# Patient Record
Sex: FEMALE | Race: WHITE | NOT HISPANIC OR LATINO | Employment: FULL TIME | URBAN - METROPOLITAN AREA
[De-identification: names, ages, dates, MRNs, and addresses within clinical notes are randomized per-mention and may not be internally consistent; named-entity substitution may affect disease eponyms.]

---

## 2017-04-10 PROBLEM — M51.360 LUMBAR DISCOGENIC PAIN SYNDROME: Status: ACTIVE | Noted: 2017-04-10

## 2024-07-26 ENCOUNTER — OFFICE VISIT (OUTPATIENT)
Dept: FAMILY MEDICINE CLINIC | Facility: CLINIC | Age: 54
End: 2024-07-26
Payer: COMMERCIAL

## 2024-07-26 VITALS
OXYGEN SATURATION: 98 % | HEIGHT: 66 IN | TEMPERATURE: 97.9 F | WEIGHT: 193.4 LBS | HEART RATE: 74 BPM | DIASTOLIC BLOOD PRESSURE: 80 MMHG | BODY MASS INDEX: 31.08 KG/M2 | SYSTOLIC BLOOD PRESSURE: 128 MMHG

## 2024-07-26 DIAGNOSIS — G89.29 CHRONIC BILATERAL LOW BACK PAIN WITH LEFT-SIDED SCIATICA: ICD-10-CM

## 2024-07-26 DIAGNOSIS — M54.2 NECK PAIN OF OVER 3 MONTHS DURATION: ICD-10-CM

## 2024-07-26 DIAGNOSIS — Z00.00 GENERAL MEDICAL EXAM: Primary | ICD-10-CM

## 2024-07-26 DIAGNOSIS — F41.8 SITUATIONAL ANXIETY: ICD-10-CM

## 2024-07-26 DIAGNOSIS — R20.0 BILATERAL ARM NUMBNESS AND TINGLING WHILE SLEEPING: ICD-10-CM

## 2024-07-26 DIAGNOSIS — M54.42 CHRONIC BILATERAL LOW BACK PAIN WITH LEFT-SIDED SCIATICA: ICD-10-CM

## 2024-07-26 DIAGNOSIS — M54.12 CERVICAL RADICULOPATHY: ICD-10-CM

## 2024-07-26 DIAGNOSIS — F32.A ANXIETY AND DEPRESSION: ICD-10-CM

## 2024-07-26 DIAGNOSIS — I42.9 CARDIOMYOPATHY, UNSPECIFIED TYPE: ICD-10-CM

## 2024-07-26 DIAGNOSIS — F41.9 ANXIETY AND DEPRESSION: ICD-10-CM

## 2024-07-26 DIAGNOSIS — R20.2 BILATERAL ARM NUMBNESS AND TINGLING WHILE SLEEPING: ICD-10-CM

## 2024-07-26 DIAGNOSIS — M79.602 PAIN IN BOTH UPPER EXTREMITIES: ICD-10-CM

## 2024-07-26 DIAGNOSIS — M79.601 PAIN IN BOTH UPPER EXTREMITIES: ICD-10-CM

## 2024-07-26 PROCEDURE — 99396 PREV VISIT EST AGE 40-64: CPT | Performed by: PHYSICIAN ASSISTANT

## 2024-07-26 RX ORDER — ALPRAZOLAM 0.5 MG/1
0.5 TABLET ORAL 3 TIMES DAILY PRN
Qty: 90 TABLET | Refills: 5 | Status: SHIPPED | OUTPATIENT
Start: 2024-07-26

## 2024-07-26 RX ORDER — GABAPENTIN 600 MG/1
600 TABLET ORAL 4 TIMES DAILY
Qty: 120 TABLET | Refills: 5 | Status: SHIPPED | OUTPATIENT
Start: 2024-07-26

## 2024-07-26 NOTE — PROGRESS NOTES
Subjective   Violet Ledesma is a 54 y.o. female  Annual Exam (Annual Physical, cardiologist is blade rogel), Anxiety (Refill on alprazolam ), Shoulder Pain (Increased bilateral shoulder pain, worse in the past month ), and Back Pain (Refill on Gabapentin)      Anxiety    Back Pain  Pertinent negatives include no numbness or weakness.     History of Present Illness  Patient presents today for a preventive medical visit.  Patient is here to determine screening labs and tests that are due and to determine immunization status as well.  Patient will be counseled regarding preventative medicine issues such as regular exercise and healthy diet as well.      The patient is a 54-year-old female who is coming in today for an annual physical. She is also following up on chronic medical problems and having chronic medications refilled.    The patient reports a history of shoulder issues, which began a few years ago. She underwent a right rotator cuff repair in 2013, which resulted in a minor tear. Despite undergoing physical therapy, her condition improved. She suspects a nerve issue in her neck, as she experiences numbness in both arms, particularly at night, which disrupts her sleep with her arms above her head. She also reports numbness in her fingertips, accompanied by aching. She also reports pain in her arms, shoulders, and elbows, which radiates from her shoulders to her elbows. She suspects a pinched nerve. Over the past 1.5 months, she has had to cease repetitive activities such as mowing her zero-turn  due to pain in her shoulders and arms. Despite the pain, she manages to finish her mower. She was scheduled for an MRI or CT scan, but her symptoms began post-procedure. Her elbows and wrists are unaffected. She has been on gabapentin for several years, which effectively manages her symptoms in her legs.    The patient underwent a pacemaker check-up last week at [facility], during which a new phenomenon  was identified. She was advised to lose weight, which she is currently walking 100 miles per month. Her cardiologist did not conduct any blood work. She consumes protein drinks and has not had her cholesterol checked.    Supplemental Information  She gets her estrogen patch filled from Gynecology. She has not had any issues since she started taking Dexilant. She wears contacts and gets her eyes checked every year. Her bowels are fine.    The following portions of the patient's history were reviewed and updated as appropriate: allergies, current medications, past social history and problem list    Review of Systems   Constitutional: Negative.  Positive for activity change.   HENT: Negative.     Eyes: Negative.    Respiratory: Negative.     Cardiovascular: Negative.    Gastrointestinal: Negative.    Endocrine: Negative.    Genitourinary: Negative.    Musculoskeletal: Negative.  Positive for arthralgias, back pain and myalgias. Negative for gait problem and joint swelling.   Skin: Negative.    Allergic/Immunologic: Negative.    Neurological: Negative.  Negative for weakness and numbness.   Hematological: Negative.    Psychiatric/Behavioral: Negative.     All other systems reviewed and are negative.      Objective     Vitals:    07/26/24 1417   BP: 128/80   Pulse: 74   Temp: 97.9 °F (36.6 °C)   SpO2: 98%       Physical Exam  Physical Exam      Assessment & Plan   Assessment & Plan  Discussed preventative medicine issues with patient including regular exercise, healthy diet, stress reduction, adequate sleep and recommended age-appropriate screening studies.      1. Annual physical.  The patient's BMP, BNP, complete blood count, and thyroid levels were all within normal ranges. A prescription for Zepbound, a weight loss injection, has been issued. Additionally, a refill of Xanax, gabapentin, Entresto, and Dexilant has been provided. A cholesterol test has been ordered.    2. Bilateral shoulder pain.  An MRI of the neck  has been ordered. The patient has been advised to continue her gabapentin regimen.    Diagnoses and all orders for this visit:    1. General medical exam (Primary)    2. Chronic bilateral low back pain with left-sided sciatica  -     gabapentin (NEURONTIN) 600 MG tablet; Take 1 tablet by mouth 4 (Four) Times a Day. For neuropathy  Dispense: 120 tablet; Refill: 5       As part of this patient's treatment plan, patient will be prescribed controlled substances. The patient has been made aware of appropriate use of such medications, including potential risk of somnolence, limited ability to drive and /or work safely, and potential for dependence or overdose. It has also been made clear that these medications are for use by this patient only, without concomitant use of alcohol or other substances unless prescribed.Controlled substance status of medication discussed with patient, discussed risks of medication including abuse potential and diversion potential and need to follow up for reevaluation appointment in order to receive further refills.    Part of this note may be an electronic transcription/translation of spoken language to printed text using the Dragon Dictation System.      Patient or patient representative verbalized consent for the use of Ambient Listening during the visit with  Kianna Gandhi PA-C for chart documentation. 7/30/2024  12:48 EDT

## 2024-07-29 ENCOUNTER — TELEPHONE (OUTPATIENT)
Dept: FAMILY MEDICINE CLINIC | Facility: CLINIC | Age: 54
End: 2024-07-29
Payer: COMMERCIAL

## 2024-07-29 NOTE — TELEPHONE ENCOUNTER
"      Hub staff attempted to follow warm transfer process and was unsuccessful     Caller: Violet Ledesma \"Therese\"    Relationship to patient: Self    Best call back number: [phone]    PATIENT WAS NOTIFIED BY PHARMACY THAT  A PRIOR AUTHORIZATION IS NEEDED FOR      Tirzepatide-Weight Management (ZEPBOUND) 2.5 MG/0.5ML solution auto-injector             "

## 2024-07-30 ENCOUNTER — TELEPHONE (OUTPATIENT)
Dept: FAMILY MEDICINE CLINIC | Facility: CLINIC | Age: 54
End: 2024-07-30
Payer: COMMERCIAL

## 2024-07-30 NOTE — TELEPHONE ENCOUNTER
Violet,       The Zepbound was denied under your insurance, they did not provide any additional information on why they have denied the medication. They will be sending you a full detailed letter in the mail with an explanation. I have attached their response below:     VIOLET NAPOLES  Drug  Zepbound 2.5MG/0.5ML pen-injectors  [facility]-Medicare Electronic PA Form   Denied   Denied request. A notification with additional details has been faxed to your office.

## 2024-08-30 ENCOUNTER — TELEPHONE (OUTPATIENT)
Dept: FAMILY MEDICINE CLINIC | Facility: CLINIC | Age: 54
End: 2024-08-30
Payer: COMMERCIAL

## 2024-08-30 DIAGNOSIS — M54.9 CHRONIC BACK PAIN, UNSPECIFIED BACK LOCATION, UNSPECIFIED BACK PAIN LATERALITY: ICD-10-CM

## 2024-08-30 DIAGNOSIS — G89.29 CHRONIC BACK PAIN, UNSPECIFIED BACK LOCATION, UNSPECIFIED BACK PAIN LATERALITY: ICD-10-CM

## 2024-08-30 DIAGNOSIS — M25.512 CHRONIC PAIN OF BOTH SHOULDERS: Primary | ICD-10-CM

## 2024-08-30 DIAGNOSIS — M25.511 CHRONIC PAIN OF BOTH SHOULDERS: Primary | ICD-10-CM

## 2024-08-30 DIAGNOSIS — M54.2 NECK PAIN: ICD-10-CM

## 2024-08-30 DIAGNOSIS — G89.29 CHRONIC PAIN OF BOTH SHOULDERS: Primary | ICD-10-CM

## 2024-08-30 NOTE — TELEPHONE ENCOUNTER
[employer] is denying authorization for MRI c spine. They want her to do physical therapy. I cannot tell that she has done this. I have sent patient a [employer] message to see if she has. In the mean time, can we get a referral for her to start physical therapy?

## 2024-08-30 NOTE — TELEPHONE ENCOUNTER
Physical therapy order has been placed I placed it as an outside order since she lives in [place] please check with her and see where she wants to attend PT.

## 2024-09-23 ENCOUNTER — OFFICE VISIT (OUTPATIENT)
Dept: FAMILY MEDICINE CLINIC | Facility: CLINIC | Age: 54
End: 2024-09-23
Payer: COMMERCIAL

## 2024-09-23 VITALS
BODY MASS INDEX: 29.6 KG/M2 | SYSTOLIC BLOOD PRESSURE: 126 MMHG | TEMPERATURE: 97.8 F | HEIGHT: 66 IN | WEIGHT: 184.2 LBS | OXYGEN SATURATION: 98 % | HEART RATE: 72 BPM | DIASTOLIC BLOOD PRESSURE: 80 MMHG

## 2024-09-23 DIAGNOSIS — I42.9 CARDIOMYOPATHY, UNSPECIFIED TYPE: ICD-10-CM

## 2024-09-23 DIAGNOSIS — E66.3 OVERWEIGHT (BMI 25.0-29.9): Primary | ICD-10-CM

## 2024-09-23 DIAGNOSIS — M51.26 LUMBAR DISCOGENIC PAIN SYNDROME: ICD-10-CM

## 2024-09-23 PROBLEM — R53.81 PHYSICAL DECONDITIONING: Status: RESOLVED | Noted: 2017-01-31 | Resolved: 2024-09-23

## 2024-09-23 PROCEDURE — 99213 OFFICE O/P EST LOW 20 MIN: CPT | Performed by: PHYSICIAN ASSISTANT

## 2024-10-14 NOTE — TELEPHONE ENCOUNTER
"Caller: Violet Ledesma \"Therese\"    Relationship: Self    Best call back number: [phone]     Requested Prescriptions:   Requested Prescriptions     Pending Prescriptions Disp Refills    Tirzepatide-Weight Management (ZEPBOUND) 5 MG/0.5ML solution auto-injector 2 mL 3     Sig: Inject 0.5 mL under the skin into the appropriate area as directed 1 (One) Time Per Week.        Pharmacy where request should be sent: [address] [phone] [facility] [phone]      Last office visit with prescribing clinician: 9/23/2024   Last telemedicine visit with prescribing clinician: Visit date not found   Next office visit with prescribing clinician: Visit date not found     Additional details provided by patient: PATIENT IS REQUESTING THE NEXT DOSE UP FOR THIS MEDICATION.     IF PA IS NEEDED FOR THE NEW DOSE PLEASE SUBMIT .    Does the patient have less than a 3 day supply:  [] Yes  [] No    Would you like a call back once the refill request has been completed: [x] Yes [] No    If the office needs to give you a call back, can they leave a voicemail: [] Yes [] No    Ezekiel Lopez Rep   10/14/24 10:56 EDT         "

## 2024-11-20 ENCOUNTER — TELEPHONE (OUTPATIENT)
Dept: FAMILY MEDICINE CLINIC | Facility: CLINIC | Age: 54
End: 2024-11-20
Payer: COMMERCIAL

## 2024-11-20 NOTE — TELEPHONE ENCOUNTER
"  Caller: Violet Ledesma \"Therese\"    Relationship: Self    Best call back number: [phone]     What is the best time to reach you: ANY    Who are you requesting to speak with (clinical staff, provider,  specific staff member): NURSE    Do you know the name of the person who called: PATIENT    What was the call regarding: PATIENT WOULD LIKE INCREASE IN DOSAGE OF ZEPBOUND.      [facility]    Is it okay if the provider responds through MyChart: PHONE CALL PLEASE  "

## 2025-01-10 NOTE — TELEPHONE ENCOUNTER
"Caller: Violet Ledesma \"Therese\"    Relationship: Self    Best call back number:  [phone] (Mobile)     Requested Prescriptions:   Requested Prescriptions      No prescriptions requested or ordered in this encounter      [facility]     Pharmacy where request should be sent:  [facility]     Last office visit with prescribing clinician: 9/23/2024   Last telemedicine visit with prescribing clinician: Visit date not found   Next office visit with prescribing clinician: Visit date not found     Additional details provided by patient:  PATIENT IS REQUESTING TO INCREASE THE DOSAGE     Does the patient have less than a 3 day supply:  [x] Yes  [] No    Would you like a call back once the refill request has been completed: [] Yes [x] No    If the office needs to give you a call back, can they leave a voicemail: [] Yes [x] No    "

## 2025-02-03 RX ORDER — FAMOTIDINE 40 MG/1
TABLET, FILM COATED ORAL
Qty: 90 TABLET | Refills: 3 | Status: SHIPPED | OUTPATIENT
Start: 2025-02-03

## 2025-02-13 ENCOUNTER — TELEPHONE (OUTPATIENT)
Dept: FAMILY MEDICINE CLINIC | Facility: CLINIC | Age: 55
End: 2025-02-13
Payer: COMMERCIAL

## 2025-02-13 RX ORDER — ONDANSETRON 4 MG/1
4 TABLET, ORALLY DISINTEGRATING ORAL EVERY 8 HOURS PRN
Qty: 15 TABLET | Refills: 1 | Status: SHIPPED | OUTPATIENT
Start: 2025-02-13

## 2025-02-13 NOTE — TELEPHONE ENCOUNTER
"    Caller: Violet Ledesma \"Therese\"    Relationship: Self    Best call back number: [phone]     What medication are you requesting: ZOFRAN 8MG    What are your current symptoms: STOMACH VIRUS    How long have you been experiencing symptoms: 3 DAYS        If a prescription is needed, what is your preferred pharmacy and phone number: [facility] - [address] [phone] [facility] [phone]      Additional notes:PATIENT STATED SHE CAN'T KEEP ANYTHING DOWN AND REQUEST TO SEE IF PCP CAN CALL THE ABOVE IN PLEASE. CALL PATIENT TO ADVISE          "

## 2025-02-28 DIAGNOSIS — M54.42 CHRONIC BILATERAL LOW BACK PAIN WITH LEFT-SIDED SCIATICA: ICD-10-CM

## 2025-02-28 DIAGNOSIS — G89.29 CHRONIC BILATERAL LOW BACK PAIN WITH LEFT-SIDED SCIATICA: ICD-10-CM

## 2025-02-28 RX ORDER — GABAPENTIN 600 MG/1
TABLET ORAL
Qty: 120 TABLET | Refills: 0 | OUTPATIENT
Start: 2025-02-28

## 2025-02-28 RX ORDER — DEXLANSOPRAZOLE 60 MG/1
CAPSULE, DELAYED RELEASE ORAL
Qty: 90 CAPSULE | Refills: 3 | Status: SHIPPED | OUTPATIENT
Start: 2025-02-28

## 2025-03-05 ENCOUNTER — OFFICE VISIT (OUTPATIENT)
Dept: FAMILY MEDICINE CLINIC | Facility: CLINIC | Age: 55
End: 2025-03-05
Payer: COMMERCIAL

## 2025-03-05 VITALS
BODY MASS INDEX: 23.3 KG/M2 | OXYGEN SATURATION: 99 % | TEMPERATURE: 98.4 F | DIASTOLIC BLOOD PRESSURE: 62 MMHG | HEART RATE: 91 BPM | SYSTOLIC BLOOD PRESSURE: 114 MMHG | WEIGHT: 145 LBS | HEIGHT: 66 IN

## 2025-03-05 DIAGNOSIS — E66.3 OVERWEIGHT (BMI 25.0-29.9): ICD-10-CM

## 2025-03-05 DIAGNOSIS — F41.8 SITUATIONAL ANXIETY: ICD-10-CM

## 2025-03-05 DIAGNOSIS — F32.A ANXIETY AND DEPRESSION: ICD-10-CM

## 2025-03-05 DIAGNOSIS — G89.29 CHRONIC BILATERAL LOW BACK PAIN WITH LEFT-SIDED SCIATICA: Primary | ICD-10-CM

## 2025-03-05 DIAGNOSIS — M54.42 CHRONIC BILATERAL LOW BACK PAIN WITH LEFT-SIDED SCIATICA: Primary | ICD-10-CM

## 2025-03-05 DIAGNOSIS — F41.9 ANXIETY AND DEPRESSION: ICD-10-CM

## 2025-03-05 DIAGNOSIS — Z51.81 ENCOUNTER FOR THERAPEUTIC DRUG MONITORING: Primary | ICD-10-CM

## 2025-03-05 DIAGNOSIS — I42.9 CARDIOMYOPATHY, UNSPECIFIED TYPE: ICD-10-CM

## 2025-03-05 DIAGNOSIS — Z12.11 COLON CANCER SCREENING: Primary | ICD-10-CM

## 2025-03-05 RX ORDER — TIZANIDINE 2 MG/1
2 TABLET ORAL NIGHTLY PRN
Qty: 90 TABLET | Refills: 3 | Status: SHIPPED | OUTPATIENT
Start: 2025-03-05

## 2025-03-05 RX ORDER — GABAPENTIN 600 MG/1
600 TABLET ORAL 4 TIMES DAILY
Qty: 120 TABLET | Refills: 5 | Status: SHIPPED | OUTPATIENT
Start: 2025-03-05

## 2025-03-05 RX ORDER — ALPRAZOLAM 0.5 MG
0.5 TABLET ORAL 3 TIMES DAILY PRN
Qty: 90 TABLET | Refills: 5 | Status: SHIPPED | OUTPATIENT
Start: 2025-03-05

## 2025-03-05 NOTE — PROGRESS NOTES
Subjective   Violet Ledesma is a 54 y.o. female  Anxiety (Refill on alprazolam), Back Pain (Refill on gabapentin), and Obesity (Follow up on weight with zepbound , doing well with the 12.5mg dosing)      History of Present Illness  History of Present Illness  The patient is a 54-year-old female who presents today for follow-up on weight loss management, generalized anxiety disorder, chronic back pain with radiculopathy, and medical management of these conditions.    She reports an overall improvement in her health status, attributing this to a significant weight loss of 40 pounds. She has been monitoring her blood pressure at home every couple of days and maintains adequate hydration. Her appetite remains satisfactory, and she continues to consume protein shakes. She reports regular bowel movements. She has not undergone any recent cholesterol testing but does not believe she has any issues related to it. She is scheduled for an echocardiogram at the end of April 2025.    She has been experiencing intermittent ear discomfort, described as a sensation of water in the ear accompanied by hearing loss and echoing sounds. However, she reports no current symptoms. She has been using Flonase, which appears to provide some relief.    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 She has been working 29 days straight without a break, which has been physically exhausting. She has requested a letter from her doctor recommending a work limit of 40 hours per week. She reports no respiratory distress, chest pain, or dizziness while at work. She feels that her anxiety has improved since starting work. She is currently taking Xanax as needed and feels that it is helping her.    She is currently taking gabapentin 4 times a day, which is helping her. She is also taking tizanidine as needed and requests a refill.    Supplemental Information  She had a consultation with her cardiologist  approximately a month ago, during which her medication regimen was adjusted due to low blood pressure, attributed to her recent weight loss. She is currently on Entresto and spironolactone, managed by her cardiologist. She is also on an estrogen patch, prescribed by her gynecologist. She recently had a mammogram and is unsure about the timing of her last colonoscopy.    SOCIAL HISTORY  She is working night shifts at [employer], a pharmaceutical company.    FAMILY HISTORY  She does not have a family history of colon cancer.    MEDICATIONS  Current: Entresto, tizanidine, Zepbound, spironolactone, montelukast, gabapentin, Xanax, estrogen patch, Flonase    The following portions of the patient's history were reviewed and updated as appropriate: allergies, current medications, past social history and problem list    Review of Systems   Constitutional:  Positive for activity change. Negative for fatigue and unexpected weight change.   HENT:  Positive for ear pain.    Respiratory: Negative.     Cardiovascular: Negative.    Musculoskeletal:  Positive for back pain (stable on medication).   Neurological:  Negative for tremors, weakness, light-headedness and headaches.   Psychiatric/Behavioral:  Negative for agitation, behavioral problems, confusion, decreased concentration, dysphoric mood, hallucinations and sleep disturbance. The patient is not nervous/anxious and is not hyperactive.        Objective     Vitals:    03/05/25 0901   BP: 114/62   Pulse: 91   Temp: 98.4 °F (36.9 °C)   SpO2: 99%       Physical Exam  Vitals and nursing note reviewed.   Constitutional:       General: She is not in acute distress.     Appearance: Normal appearance. She is well-developed. She is not ill-appearing, toxic-appearing or diaphoretic.      Comments: BMI23   HENT:      Head: Normocephalic and atraumatic.      Right Ear: Tympanic membrane, ear canal and external ear normal. There is no impacted cerumen.      Left Ear: Tympanic membrane, ear  canal and external ear normal. There is no impacted cerumen.   Eyes:      Conjunctiva/sclera: Conjunctivae normal.   Cardiovascular:      Rate and Rhythm: Normal rate and regular rhythm.   Pulmonary:      Effort: Pulmonary effort is normal.      Breath sounds: Normal breath sounds.   Musculoskeletal:         General: Normal range of motion.   Neurological:      Mental Status: She is alert and oriented to person, place, and time.      Coordination: Coordination normal.   Psychiatric:         Attention and Perception: She is attentive.         Mood and Affect: Mood normal.         Behavior: Behavior normal.         Thought Content: Thought content normal.         Judgment: Judgment normal.       Physical Exam  Ears are normal.    Vital Signs  Blood pressure is 114/62.    Assessment & Plan   Assessment & Plan  1. Weight loss management.  Her weight loss has positively impacted her cardiac health, as evidenced by her improved ejection fraction. She reports feeling better than she has in 5 years and has lost 40 pounds. She is advised to continue monitoring her blood pressure and to contact her cardiologist if she experiences symptoms such as dizziness or lightheadedness. She is also advised to request a copy of her upcoming echocardiogram results from her cardiologist.    2. Generalized anxiety disorder.  Her anxiety levels have decreased since returning to work, which has provided her with a sense of purpose and routine. She is advised to continue her current regimen of Xanax as needed. A prescription for Xanax will be sent to her pharmacy for a 6-month supply.    3. Chronic back pain with radiculopathy.  She is advised to continue her current regimen of gabapentin, taking 4 doses per day. A prescription for gabapentin will be sent to her pharmacy for a 6-month supply. She is also advised to continue using tizanidine as needed for muscle relaxation. A prescription for tizanidine will be sent to [facility] mail order.    4. Ear  discomfort.  Her ear discomfort may be due to changes in weather or barometric pressure, affecting her eustachian tubes and sinuses. She is advised to continue using Flonase, which has been effective in managing her symptoms.    5. Medication management.  She is advised to continue her current medications, including Zepbound and spironolactone. A prescription for Zepbound will be sent to her pharmacy for a 6-month supply.    Follow-up  The patient will follow up in 6 months.    PROCEDURE  The patient had an upper endoscopy in 2023.    Diagnoses and all orders for this visit:    1. Chronic bilateral low back pain with left-sided sciatica (Primary)  -     gabapentin (NEURONTIN) 600 MG tablet; Take 1 tablet by mouth 4 (Four) Times a Day. For neuropathy  Dispense: 120 tablet; Refill: 5    2. Overweight (BMI 25.0-29.9)    3. Anxiety and depression    4. Cardiomyopathy, unspecified type    Other orders  -     tiZANidine (ZANAFLEX) 2 MG tablet; Take 1 tablet by mouth At Night As Needed for Muscle Spasms.  Dispense: 90 tablet; Refill: 3  -     Tirzepatide-Weight Management (ZEPBOUND) 12.5 MG/0.5ML solution auto-injector; Inject 0.5 mL under the skin into the appropriate area as directed 1 (One) Time Per Week.  Dispense: 2 mL; Refill: 5    Electronic prescription will be submitted for a refill of current dosage of Xanax 0.5 mg 1 3 times daily as needed for anxiety #90 with 5 refills per Dr. Ibarra.  Follow-up in 6 months and as needed.    As part of this patient's treatment plan, patient will be prescribed controlled substances. The patient has been made aware of appropriate use of such medications, including potential risk of somnolence, limited ability to drive and /or work safely, and potential for dependence or overdose. It has also been made clear that these medications are for use by this patient only, without concomitant use of alcohol or other substances unless prescribed.Controlled substance status of medication  discussed with patient, discussed risks of medication including abuse potential and diversion potential and need to follow up for reevaluation appointment in order to receive further refills.    Part of this note may be an electronic transcription/translation of spoken language to printed text using the Dragon Dictation System.    Controlled substance agreement updated today UDS obtained.    Patient or patient representative verbalized consent for the use of Ambient Listening during the visit with  Kianna Gandhi PA-C for chart documentation. 3/5/2025  11:20 EST

## 2025-03-05 NOTE — LETTER
March 5, 2025    Violet Ledesma  [address] [address]          To whom it may concern,      Therese Ledesma is under my medical care for her chronic medical conditions.  It is my medical opinion that she limit her employed workweek to a maximum of 40 hours/week.              Sincerely,        Kianna Gandhi PA-C

## 2025-03-07 ENCOUNTER — PREP FOR SURGERY (OUTPATIENT)
Dept: OTHER | Facility: HOSPITAL | Age: 55
End: 2025-03-07
Payer: COMMERCIAL

## 2025-03-07 DIAGNOSIS — Z86.0100 HISTORY OF COLONIC POLYPS: Primary | ICD-10-CM

## 2025-03-07 DIAGNOSIS — Z12.11 ENCOUNTER FOR SCREENING COLONOSCOPY: ICD-10-CM

## 2025-03-11 ENCOUNTER — TELEPHONE (OUTPATIENT)
Dept: GASTROENTEROLOGY | Facility: CLINIC | Age: 55
End: 2025-03-11

## 2025-03-11 NOTE — TELEPHONE ENCOUNTER
PT IS ON THE HOSPITAL ENDO SCHEDULE WITH DR NAPOLES FOR A COLON ON 5/6 AND HAS A PACEMAKER/DEFIBRILLATOR COMBO, IT IS MANAGED BY [facility], SHE IS NOT SURE OF THE DR NAME, BUT HAS THEIR PHONE NUMBER. P# [phone]

## 2025-03-12 LAB — DRUGS UR: NORMAL

## 2025-03-31 ENCOUNTER — TRANSCRIBE ORDERS (OUTPATIENT)
Dept: ADMINISTRATIVE | Facility: HOSPITAL | Age: 55
End: 2025-03-31
Payer: COMMERCIAL

## 2025-03-31 DIAGNOSIS — Z12.31 VISIT FOR SCREENING MAMMOGRAM: Primary | ICD-10-CM

## 2025-04-02 LAB
NCCN CRITERIA FLAG: NORMAL
TYRER CUZICK SCORE: 7

## 2025-04-07 ENCOUNTER — HOSPITAL ENCOUNTER (OUTPATIENT)
Dept: MAMMOGRAPHY | Facility: HOSPITAL | Age: 55
Discharge: HOME | End: 2025-04-07
Admitting: PHYSICIAN ASSISTANT
Payer: COMMERCIAL

## 2025-04-07 DIAGNOSIS — Z12.31 VISIT FOR SCREENING MAMMOGRAM: ICD-10-CM

## 2025-04-07 PROCEDURE — 77067 SCR MAMMO BI INCL CAD: CPT | Performed by: RADIOLOGY

## 2025-04-07 PROCEDURE — 77067 SCR MAMMO BI INCL CAD: CPT

## 2025-04-07 PROCEDURE — 77063 BREAST TOMOSYNTHESIS BI: CPT

## 2025-04-07 PROCEDURE — 77063 BREAST TOMOSYNTHESIS BI: CPT | Performed by: RADIOLOGY

## 2025-04-11 ENCOUNTER — TELEPHONE (OUTPATIENT)
Dept: FAMILY MEDICINE CLINIC | Facility: CLINIC | Age: 55
End: 2025-04-11
Payer: COMMERCIAL

## 2025-04-11 NOTE — TELEPHONE ENCOUNTER
"Caller: Violet Ledesma \"Therese\"    Relationship: Self    Best call back number: [phone]     What medication are you requesting: ZEPBOUND NEXT DOSAGE     If a prescription is needed, what is your preferred pharmacy and phone number: [address]  - [phone]  - [phone]      Additional notes: LESS THAN 3 DAYS LEFT OF MEDICATION         "

## 2025-04-21 RX ORDER — ONDANSETRON 4 MG/1
TABLET, ORALLY DISINTEGRATING ORAL
Qty: 15 TABLET | Refills: 1 | Status: SHIPPED | OUTPATIENT
Start: 2025-04-21

## 2025-05-08 ENCOUNTER — TELEPHONE (OUTPATIENT)
Dept: GASTROENTEROLOGY | Facility: CLINIC | Age: 55
End: 2025-05-08

## 2025-05-08 NOTE — TELEPHONE ENCOUNTER
"Hub staff attempted to follow warm transfer process and was unsuccessful     Caller: Violet Ledesma \"Therese\"    Relationship to patient: Self    Best call back number: [phone]    Patient is needing: PT IS NEEDING TO R/S HER COLONOSCOPY THAT'S SCHEDULED ON 7/29/2025 TO POSSIBLY 8/11/2025.    PLEASE CALL AND ADVISE. IT'S OK TO [facility].   "

## 2025-05-09 NOTE — TELEPHONE ENCOUNTER
PT HAS BEEN RESCHEDULED PER HER REQUEST TO 8/12 ARRIVING AT 8 AM. PAT HAS BEEN MOVED TO 8/5 AT 8:30. NEW LATTER SENT.

## 2025-06-16 ENCOUNTER — TELEPHONE (OUTPATIENT)
Dept: GASTROENTEROLOGY | Facility: CLINIC | Age: 55
End: 2025-06-16

## 2025-06-16 NOTE — TELEPHONE ENCOUNTER
CALLED PT TO RESCHEDULE HER COLON AND PAT THAT IS SCHEDULED IN THE HOSPITAL WITH NIYAH BRYANT ON 8/12. LEFT MESSAGE FOR PT TO CALL BACK TO RESCHEDULE TO NEXT AVAILABLE.

## 2025-06-18 ENCOUNTER — OFFICE VISIT (OUTPATIENT)
Dept: FAMILY MEDICINE CLINIC | Facility: CLINIC | Age: 55
End: 2025-06-18
Payer: COMMERCIAL

## 2025-06-18 VITALS
DIASTOLIC BLOOD PRESSURE: 78 MMHG | SYSTOLIC BLOOD PRESSURE: 114 MMHG | HEIGHT: 66 IN | TEMPERATURE: 98.2 F | BODY MASS INDEX: 20.57 KG/M2 | WEIGHT: 128 LBS | HEART RATE: 71 BPM | OXYGEN SATURATION: 100 %

## 2025-06-18 DIAGNOSIS — I42.5 OTHER RESTRICTIVE CARDIOMYOPATHY: ICD-10-CM

## 2025-06-18 DIAGNOSIS — E66.3 OVERWEIGHT: Primary | ICD-10-CM

## 2025-06-18 DIAGNOSIS — J30.89 NON-SEASONAL ALLERGIC RHINITIS, UNSPECIFIED TRIGGER: ICD-10-CM

## 2025-06-18 DIAGNOSIS — M25.512 CHRONIC PAIN OF BOTH SHOULDERS: ICD-10-CM

## 2025-06-18 DIAGNOSIS — M25.511 CHRONIC PAIN OF BOTH SHOULDERS: ICD-10-CM

## 2025-06-18 DIAGNOSIS — G89.29 CHRONIC PAIN OF BOTH SHOULDERS: ICD-10-CM

## 2025-06-18 RX ORDER — LEVOCETIRIZINE DIHYDROCHLORIDE 5 MG/1
5 TABLET, FILM COATED ORAL EVERY EVENING
Qty: 30 TABLET | Refills: 11 | Status: SHIPPED | OUTPATIENT
Start: 2025-06-18 | End: 2025-06-18

## 2025-06-18 RX ORDER — MONTELUKAST SODIUM 10 MG/1
10 TABLET ORAL NIGHTLY
Qty: 90 TABLET | Refills: 3 | Status: SHIPPED | OUTPATIENT
Start: 2025-06-18 | End: 2025-06-18

## 2025-06-18 RX ORDER — MONTELUKAST SODIUM 10 MG/1
10 TABLET ORAL NIGHTLY
Qty: 90 TABLET | Refills: 3 | Status: SHIPPED | OUTPATIENT
Start: 2025-06-18

## 2025-06-18 RX ORDER — LEVOCETIRIZINE DIHYDROCHLORIDE 5 MG/1
5 TABLET, FILM COATED ORAL EVERY EVENING
Qty: 90 TABLET | Refills: 3 | Status: SHIPPED | OUTPATIENT
Start: 2025-06-18

## 2025-06-18 NOTE — PROGRESS NOTES
Subjective   Violet Ledesma is a 55 y.o. female  Weight Management (Follow up on zepbound) and Ear Fullness (Bilateral ear fullness for the past few weeks)      History of Present Illness  History of Present Illness  The patient is a 55-year-old female coming in today for follow-up on weight loss management due to a history of obesity, being managed with Zepbound, and also for evaluation of a new problem of ear fullness for the past few weeks.    She reports no adverse effects from the Zepbound injections and continues to consume protein shakes daily. She has successfully maintained her weight for the past 2 months and is currently focusing on weight maintenance. She plans to reduce the frequency of her Zepbound injections to every 2 weeks. She has lost 65 pounds.    She has been experiencing ear discomfort for several months, characterized by a sensation of water in the ear and occasional echoing sounds during speech. She does not report any tinnitus. She occasionally experiences sinus congestion, which she manages with Flonase. She has not previously used Xyzal or Zyrtec.    She has a suspected fractured lead, likely due to gym activities, and has been advised to avoid the gym until further notice. She is scheduled to see Dr. HANKS on 07/24/2025. She reports no shortness of breath or chest pain. Her pacemaker report is normal. She is permitted to walk but has been cautioned against lifting heavy objects.    She has experienced dizziness and lightheadedness on a few occasions, which were discussed with Dr. Flores, resulting in an adjustment of her Entresto dosage.    She is currently on an estrogen patch, which effectively manages her hot flashes.    She continues to take gabapentin and montelukast.    She is having really bad issues with her shoulders. She has an awful knot that has come up on her shoulder. At work, certain things she does with her arm will make it hurt all the way down her arm, so she has to stop  what she is doing. She wonders if that could be caused from when she had that rotator cuff repair in 2013.    Supplemental Information  She is due for a colonoscopy, which was postponed due to the lead issue.    MEDICATIONS  Current: Zepbound, Flonase, Dexilant, estrogen patch, gabapentin, montelukast    The following portions of the patient's history were reviewed and updated as appropriate: allergies, current medications, past social history and problem list    Review of Systems   Constitutional:  Positive for activity change. Negative for fatigue and unexpected weight change.   Respiratory:  Negative for cough, chest tightness and shortness of breath.    Cardiovascular:  Negative for chest pain, palpitations and leg swelling.   Gastrointestinal:  Negative for nausea.   Musculoskeletal:  Positive for arthralgias and myalgias. Negative for back pain, gait problem and joint swelling.   Skin: Negative.  Negative for color change and rash.   Neurological:  Negative for dizziness, syncope, weakness, numbness and headaches.   Psychiatric/Behavioral: Negative.         Objective     Vitals:    06/18/25 1348   BP: 114/78   Pulse: 71   Temp: 98.2 °F (36.8 °C)   SpO2: 100%       Physical Exam  Vitals and nursing note reviewed.   Constitutional:       General: She is not in acute distress.     Appearance: Normal appearance. She is well-developed. She is not ill-appearing, toxic-appearing or diaphoretic.      Comments: BMI20   HENT:      Head: Normocephalic and atraumatic.      Right Ear: Tympanic membrane, ear canal and external ear normal. There is no impacted cerumen.      Left Ear: Tympanic membrane, ear canal and external ear normal. There is no impacted cerumen.      Nose: Nose normal.   Eyes:      Conjunctiva/sclera: Conjunctivae normal.   Neck:      Vascular: No JVD.   Cardiovascular:      Rate and Rhythm: Normal rate and regular rhythm.      Pulses: Normal pulses.      Heart sounds: Normal heart sounds. No murmur  heard.  Pulmonary:      Effort: Pulmonary effort is normal. No respiratory distress.      Breath sounds: Normal breath sounds.   Chest:      Chest wall: No tenderness.   Abdominal:      General: There is no distension.      Palpations: Abdomen is soft. There is no mass.      Tenderness: There is no abdominal tenderness.   Musculoskeletal:         General: Tenderness (right upper trapezius) present. No swelling. Normal range of motion.   Skin:     General: Skin is warm and dry.      Coloration: Skin is not pale.      Findings: No erythema.   Neurological:      Mental Status: She is alert.      Coordination: Coordination normal.   Psychiatric:         Mood and Affect: Mood normal.         Behavior: Behavior normal.         Thought Content: Thought content normal.         Judgment: Judgment normal.       Physical Exam  Eardrums appear normal. Throat was examined.  Lungs sound normal.    Assessment & Plan   Assessment & Plan  1. Weight loss management.  She has achieved her target weight and is now focusing on maintenance. The current plan is to continue with the same dosage of Zepbound but administer it less frequently, specifically every 2 weeks. A prescription for Zepbound with a 6-month refill has been provided.    2. Ear fullness.  The symptoms suggest a potential issue with the eustachian tubes. She has been advised to continue using Flonase and to add Xyzal to her regimen. A prescription for Xyzal has been provided, which she should take daily until the winter season. She has been cautioned against the use of decongestants such as Sudafed.    3. Fractured lead.  She has a suspected fractured lead, likely due to gym activities, and has been advised to avoid the gym until further notice. She is scheduled to see Dr. HANKS on 07/24/2025.    4. Low blood pressure.  She has experienced dizziness and lightheadedness on a few occasions, which were discussed with Dr. Flores, resulting in an adjustment of her Entresto  dosage.    5. Hot flashes.  She is currently on an estrogen patch, which effectively manages her hot flashes.    6. Shoulder pain.  The shoulder pain is likely due to overuse and muscle strain from significant weight loss. Massage therapy has been recommended for relief. Once the lead issue is resolved, physical therapy will be considered to strengthen the muscles.    7. Medication management.  She continues to take gabapentin and montelukast.    PROCEDURE  The patient had a rotator cuff repair in 2013.    Diagnoses and all orders for this visit:    1. Overweight (Primary)    2. Other restrictive cardiomyopathy    3. Non-seasonal allergic rhinitis, unspecified trigger    4. Chronic pain of both shoulders    Other orders  -     Discontinue: levocetirizine (XYZAL) 5 MG tablet; Take 1 tablet by mouth Every Evening. For ear fluid  Dispense: 30 tablet; Refill: 11  -     Discontinue: montelukast (SINGULAIR) 10 MG tablet; Take 1 tablet by mouth Every Night.  Dispense: 90 tablet; Refill: 3  -     Tirzepatide-Weight Management (ZEPBOUND) 15 MG/0.5ML solution auto-injector; Inject 0.5 mL under the skin into the appropriate area as directed 1 (One) Time Per Week.  Dispense: 2 mL; Refill: 5  -     levocetirizine (XYZAL) 5 MG tablet; Take 1 tablet by mouth Every Evening. For ear fluid  Dispense: 90 tablet; Refill: 3  -     montelukast (SINGULAIR) 10 MG tablet; Take 1 tablet by mouth Every Night.  Dispense: 90 tablet; Refill: 3         Patient or patient representative verbalized consent for the use of Ambient Listening during the visit with  Kianna Gandhi PA-C for chart documentation. 6/18/2025  14:37 EDT

## 2025-07-08 RX ORDER — ONDANSETRON 4 MG/1
4 TABLET, ORALLY DISINTEGRATING ORAL EVERY 8 HOURS PRN
Qty: 15 TABLET | Refills: 11 | Status: SHIPPED | OUTPATIENT
Start: 2025-07-08

## 2025-07-08 NOTE — TELEPHONE ENCOUNTER
"    Caller: Violet Ledesma \"Therese\"    Relationship: Self    Best call back number: [phone]     Which medication are you concerned about: ZEPBOUND NO LONGER COVERED AS OF JULY 1    Who prescribed you this medication: MARIAH BENDER    When did you start taking this medication:     What are your concerns: MEDICATION NO LONGER COVERED AND AN ALTERNATIVE IS NEEDED.   WEGOVY, SAXENDA, QSYMIA, ORLISTAT IS THE LIST THEY GAVE HER THAT THEY WOULD COVER.    How long have you had these concerns:           "

## 2025-07-14 ENCOUNTER — TELEPHONE (OUTPATIENT)
Dept: FAMILY MEDICINE CLINIC | Facility: CLINIC | Age: 55
End: 2025-07-14
Payer: COMMERCIAL

## 2025-07-14 NOTE — TELEPHONE ENCOUNTER
Insurance changed formulary for Wegovy, I have pended the equivalent dosing to PCP for new Wegovy prescription

## 2025-07-14 NOTE — TELEPHONE ENCOUNTER
"  Caller: Violet Ledesma \"Therese\"    Relationship: Self    Best call back number: [phone] PLEASE LEAVE DETAILED VOICEMAIL.    If a prescription is needed, what is your preferred pharmacy and phone number: [address] - [phone]  - [phone]      Additional notes: THIS IS SAME AS ENCOUNTER:      7/8/25  1:14 PM  Note            Caller: Violet Ledesma \"Therese\"     Relationship: Self     Best call back number: [phone]      Which medication are you concerned about: ZEPBOUND NO LONGER COVERED AS OF JULY 1     Who prescribed you this medication: MARIAH BENDER     When did you start taking this medication:      What are your concerns: MEDICATION NO LONGER COVERED AND AN ALTERNATIVE IS NEEDED.   WEGOVY, SAXENDA, QSYMIA, ORLISTAT IS THE LIST THEY GAVE HER THAT THEY WOULD COVER.        WE SENT IN ZOFRAN FOR HER INSTEAD ONE OF THE ABOVE LISTED MEDS THAT THE INSURANCE WILL COVER.      "